# Patient Record
Sex: MALE | URBAN - METROPOLITAN AREA
[De-identification: names, ages, dates, MRNs, and addresses within clinical notes are randomized per-mention and may not be internally consistent; named-entity substitution may affect disease eponyms.]

---

## 2022-06-27 ENCOUNTER — APPOINTMENT (RX ONLY)
Dept: URBAN - METROPOLITAN AREA CLINIC 158 | Facility: CLINIC | Age: 37
Setting detail: DERMATOLOGY
End: 2022-06-27

## 2022-06-27 DIAGNOSIS — L81.1 CHLOASMA: ICD-10-CM

## 2022-06-27 PROCEDURE — ? PATIENT SPECIFIC COUNSELING

## 2022-06-27 PROCEDURE — ? PRESCRIPTION

## 2022-06-27 PROCEDURE — ? COUNSELING

## 2022-06-27 PROCEDURE — 99203 OFFICE O/P NEW LOW 30 MIN: CPT

## 2022-06-27 PROCEDURE — ? EDUCATIONAL RESOURCES PROVIDED

## 2022-06-27 RX ORDER — HYDROQUINONE 40 MG/G
1 CREAM TOPICAL AT NIGHT
Qty: 28.35 | Refills: 3 | COMMUNITY
Start: 2022-06-27

## 2022-06-27 RX ADMIN — HYDROQUINONE 1: 40 CREAM TOPICAL at 00:00

## 2022-06-27 ASSESSMENT — LOCATION DETAILED DESCRIPTION DERM
LOCATION DETAILED: LEFT INFERIOR CENTRAL MALAR CHEEK
LOCATION DETAILED: RIGHT INFERIOR CENTRAL MALAR CHEEK

## 2022-06-27 ASSESSMENT — LOCATION SIMPLE DESCRIPTION DERM
LOCATION SIMPLE: RIGHT CHEEK
LOCATION SIMPLE: LEFT CHEEK

## 2022-06-27 ASSESSMENT — LOCATION ZONE DERM: LOCATION ZONE: FACE

## 2022-06-27 NOTE — PROCEDURE: PATIENT SPECIFIC COUNSELING
Printed prescription for hydroquinone 4% cream given with goodrx rebate to [facility].\\nWe'll start treatment with hydrocodone 4% cream to be applied at bedtime along with sunscreen in the morning.  If condition does not improve after 1 tube (or 2-3 months) on hydroquinone cream, patient may need to pursue procedural treatments such as chemical peels or laser.  We do not offer these cosmetic service here at [facility]  Cosmetic referral list is given to the patient if he does wish to pursue these procedures.  I told patient that hydroquinone cream may not be covered under medical insurance as sometimes it is considered a cosmetic product.
Detail Level: Detailed